# Patient Record
Sex: FEMALE | Race: BLACK OR AFRICAN AMERICAN | NOT HISPANIC OR LATINO | Employment: OTHER | URBAN - METROPOLITAN AREA
[De-identification: names, ages, dates, MRNs, and addresses within clinical notes are randomized per-mention and may not be internally consistent; named-entity substitution may affect disease eponyms.]

---

## 2017-05-22 ENCOUNTER — PATIENT OUTREACH (OUTPATIENT)
Dept: ADMINISTRATIVE | Facility: HOSPITAL | Age: 67
End: 2017-05-22

## 2017-06-28 ENCOUNTER — OFFICE VISIT (OUTPATIENT)
Dept: HEPATOLOGY | Facility: CLINIC | Age: 67
End: 2017-06-28
Payer: MEDICARE

## 2017-06-28 ENCOUNTER — HOSPITAL ENCOUNTER (OUTPATIENT)
Dept: RADIOLOGY | Facility: HOSPITAL | Age: 67
Discharge: HOME | End: 2017-06-28
Attending: INTERNAL MEDICINE
Payer: MEDICARE

## 2017-06-28 VITALS
HEIGHT: 60 IN | DIASTOLIC BLOOD PRESSURE: 60 MMHG | OXYGEN SATURATION: 96 % | HEART RATE: 59 BPM | SYSTOLIC BLOOD PRESSURE: 131 MMHG | TEMPERATURE: 98 F | BODY MASS INDEX: 41.94 KG/M2 | RESPIRATION RATE: 16 BRPM | WEIGHT: 213.63 LBS

## 2017-06-28 DIAGNOSIS — Z86.19 HISTORY OF HEPATITIS C: ICD-10-CM

## 2017-06-28 DIAGNOSIS — B19.20 COMPENSATED HCV CIRRHOSIS: Primary | ICD-10-CM

## 2017-06-28 DIAGNOSIS — K74.69 OTHER CIRRHOSIS OF LIVER: ICD-10-CM

## 2017-06-28 DIAGNOSIS — K74.69 COMPENSATED HCV CIRRHOSIS: Primary | ICD-10-CM

## 2017-06-28 DIAGNOSIS — K74.60 HEPATIC CIRRHOSIS, UNSPECIFIED HEPATIC CIRRHOSIS TYPE: ICD-10-CM

## 2017-06-28 PROCEDURE — 76700 US EXAM ABDOM COMPLETE: CPT | Mod: 26,,, | Performed by: RADIOLOGY

## 2017-06-28 PROCEDURE — 99214 OFFICE O/P EST MOD 30 MIN: CPT | Mod: S$GLB,,, | Performed by: INTERNAL MEDICINE

## 2017-06-28 PROCEDURE — 99499 UNLISTED E&M SERVICE: CPT | Mod: S$GLB,,, | Performed by: INTERNAL MEDICINE

## 2017-06-28 PROCEDURE — 99999 PR PBB SHADOW E&M-EST. PATIENT-LVL III: CPT | Mod: PBBFAC,,, | Performed by: INTERNAL MEDICINE

## 2017-06-28 PROCEDURE — 76700 US EXAM ABDOM COMPLETE: CPT | Mod: TC

## 2017-06-28 RX ORDER — GLUCOSAM/CHON-MSM1/C/MANG/BOSW 500-416.6
TABLET ORAL
COMMUNITY
Start: 2017-06-18

## 2017-06-28 RX ORDER — INSULIN ASPART 100 [IU]/ML
INJECTION, SOLUTION INTRAVENOUS; SUBCUTANEOUS
COMMUNITY
Start: 2017-06-18

## 2017-06-28 NOTE — PROGRESS NOTES
Subjective:       Patient ID: Mica Gordon is a 66 y.o. female.    Chief Complaint: Hepatitis C and Cirrhosis    HPI  I saw this 66 y.o. lady with a history of chronic HCV infection  Who has now achieved SVR 24- completed Harvoni 8 week regimen for genotype 1b      Pathology revealed advanced stage fibrosis: fibrosis is variable w/ bridging to macronodule formation, also consider steatohepatitis. Early cirrhosis (stage 4 out of 4). 5% macro and microsteatosis.      She states she feels well, has had no changes in her medical history since last seen. She denies symptoms of advanced fibrosis such as dark urine, abdominal fluid accumulation, yellowing/jaundice of skin or eyes, vomiting of blood, black stool, confusion or tremors.     MELD-Na score: 6 at 8/25/2016 10:34 AM  MELD score: 6 at 8/25/2016 10:34 AM  Calculated from:  Serum Creatinine: 1.0 mg/dL at 8/25/2016 10:34 AM  Serum Sodium: 141 mmol/L (Rounded to 137) at 8/25/2016 10:34 AM  Total Bilirubin: 0.7 mg/dL (Rounded to 1) at 8/25/2016 10:34 AM  INR(ratio): 1.0 at 8/25/2016 10:34 AM  Age: 65 years    AFP 2.7    Abdo US: 6/28/2017  1.  Hepatomegaly and findings compatible with hepatic steatosis.  2. Status post cholecystectomy.    EGD: Aug 2015  No varices    Review of Systems   Constitutional: Negative for activity change, appetite change, chills, fatigue, fever and unexpected weight change.   HENT: Negative for ear pain, hearing loss, nosebleeds, sore throat and trouble swallowing.    Eyes: Negative for redness and visual disturbance.   Respiratory: Negative for cough, chest tightness, shortness of breath and wheezing.    Cardiovascular: Negative for chest pain and palpitations.   Gastrointestinal: Negative for abdominal distention, abdominal pain, blood in stool, constipation, diarrhea, nausea and vomiting.   Genitourinary: Negative for difficulty urinating, dysuria, frequency, hematuria and urgency.   Musculoskeletal: Negative for arthralgias, back  pain, gait problem, joint swelling and myalgias.   Skin: Negative for rash.   Neurological: Negative for tremors, seizures, speech difficulty, weakness and headaches.   Hematological: Negative for adenopathy.   Psychiatric/Behavioral: Negative for confusion, decreased concentration and sleep disturbance. The patient is not nervous/anxious.          Lab Results   Component Value Date    ALT 18 06/28/2017    AST 18 06/28/2017    GGT 24 07/13/2015    ALKPHOS 72 06/28/2017    BILITOT 0.6 06/28/2017     Past Medical History:   Diagnosis Date    Diabetes mellitus type II     Hepatitis C     Hyperlipidemia     Hypertension     Seizure 2009    hx partial seizure      Past Surgical History:   Procedure Laterality Date    HYSTERECTOMY       Current Outpatient Prescriptions   Medication Sig    amlodipine (NORVASC) 10 MG tablet Take 1 tablet by mouth once daily.    aspirin (ECOTRIN) 81 MG EC tablet Take 81 mg by mouth once daily.    atorvastatin (LIPITOR) 40 MG tablet Take 40 mg by mouth once daily.    BD INSULIN SYRINGE ULT-FINE II 0.5 mL 31 gauge x 5/16 Syrg     gabapentin (NEURONTIN) 600 MG tablet Take 600 mg by mouth 2 (two) times daily.     glipiZIDE (GLUCOTROL) 10 MG tablet Take 10 mg by mouth 2 (two) times daily.    LANTUS SOLOSTAR 100 unit/mL (3 mL) InPn pen Inject 50 units subcutaneously at bedtime    lisinopril-hydrochlorothiazide (PRINZIDE,ZESTORETIC) 10-12.5 mg per tablet Take 1 tablet by mouth once daily.    metformin (GLUCOPHAGE) 1000 MG tablet Take one tablet by mouth twice daily    metoprolol tartrate (LOPRESSOR) 50 MG tablet Take 1 tablet by mouth 2 (two) times daily.    NOVOLOG 100 unit/mL injection     pravastatin (PRAVACHOL) 40 MG tablet Take one tablet by mouth every day    TRUEPLUS LANCETS 30 gauge Misc     TRUETEST TEST STRIPS Strp     omeprazole (PRILOSEC) 20 MG capsule Take 1 capsule (20 mg total) by mouth 2 (two) times daily.     No current facility-administered medications for  this visit.        Objective:      Physical Exam   Constitutional: She appears well-nourished. No distress.   HENT:   Head: Normocephalic.   Eyes: Pupils are equal, round, and reactive to light.   Neck: No JVD present. No tracheal deviation present. No thyromegaly present.   Cardiovascular: Normal rate, regular rhythm and normal heart sounds.    No murmur heard.  Pulmonary/Chest: Effort normal and breath sounds normal. No stridor.   Abdominal: Soft.   Lymphadenopathy:        Head (right side): No submental, no submandibular, no tonsillar, no preauricular, no posterior auricular and no occipital adenopathy present.        Head (left side): No submental, no submandibular, no tonsillar, no preauricular, no posterior auricular and no occipital adenopathy present.     She has no cervical adenopathy.     She has no axillary adenopathy.   Neurological: She is alert. She has normal strength. She is not disoriented. No cranial nerve deficit or sensory deficit.   Skin: Skin is intact. No cyanosis.         Assessment:       1. Compensated HCV cirrhosis    2. Other cirrhosis of liver        Plan:   Well. Stable early cirrhosis.    - HCC screening up to date  - well compensated disease    Clinic in 6 months with US/labs.  Can have one drink on 4th July but asked not to have alcohol otherwise.

## 2017-06-30 ENCOUNTER — TELEPHONE (OUTPATIENT)
Dept: HEPATOLOGY | Facility: CLINIC | Age: 67
End: 2017-06-30

## 2017-06-30 NOTE — TELEPHONE ENCOUNTER
----- Message from James Palm MD sent at 6/30/2017  3:37 PM CDT -----  Please let her know that her blood work is stable

## 2017-07-03 NOTE — PROGRESS NOTES
Patient, Mica Gordon (MRN #[id number]), presented with a recorded BMI of 41.72 kg/m^2 consistent with the definition of morbid obesity (ICD-10 E66.01). The patient's morbid obesity was monitored, evaluated, addressed and/or treated. This addendum to the medical record is made on 07/02/2017.

## 2018-03-20 DIAGNOSIS — K74.60 CIRRHOSIS OF LIVER WITHOUT ASCITES, UNSPECIFIED HEPATIC CIRRHOSIS TYPE: Primary | ICD-10-CM

## 2018-03-20 DIAGNOSIS — Z86.19 HISTORY OF HEPATITIS C: ICD-10-CM

## 2018-04-04 ENCOUNTER — HOSPITAL ENCOUNTER (OUTPATIENT)
Dept: RADIOLOGY | Facility: HOSPITAL | Age: 68
Discharge: HOME | End: 2018-04-04
Attending: INTERNAL MEDICINE
Payer: MEDICARE

## 2018-04-04 ENCOUNTER — OFFICE VISIT (OUTPATIENT)
Dept: HEPATOLOGY | Facility: CLINIC | Age: 68
End: 2018-04-04
Payer: MEDICARE

## 2018-04-04 VITALS
BODY MASS INDEX: 43.02 KG/M2 | SYSTOLIC BLOOD PRESSURE: 125 MMHG | WEIGHT: 219.13 LBS | OXYGEN SATURATION: 94 % | TEMPERATURE: 97 F | HEART RATE: 64 BPM | HEIGHT: 60 IN | DIASTOLIC BLOOD PRESSURE: 58 MMHG | RESPIRATION RATE: 18 BRPM

## 2018-04-04 DIAGNOSIS — E66.01 CLASS 3 SEVERE OBESITY DUE TO EXCESS CALORIES WITHOUT SERIOUS COMORBIDITY WITH BODY MASS INDEX (BMI) OF 40.0 TO 44.9 IN ADULT: ICD-10-CM

## 2018-04-04 DIAGNOSIS — K74.69 COMPENSATED HCV CIRRHOSIS: Primary | ICD-10-CM

## 2018-04-04 DIAGNOSIS — K74.69 COMPENSATED HCV CIRRHOSIS: ICD-10-CM

## 2018-04-04 DIAGNOSIS — B19.20 COMPENSATED HCV CIRRHOSIS: ICD-10-CM

## 2018-04-04 DIAGNOSIS — B19.20 COMPENSATED HCV CIRRHOSIS: Primary | ICD-10-CM

## 2018-04-04 PROCEDURE — 99214 OFFICE O/P EST MOD 30 MIN: CPT | Mod: S$GLB,,, | Performed by: INTERNAL MEDICINE

## 2018-04-04 PROCEDURE — 76700 US EXAM ABDOM COMPLETE: CPT | Mod: TC

## 2018-04-04 PROCEDURE — 3078F DIAST BP <80 MM HG: CPT | Mod: CPTII,S$GLB,, | Performed by: INTERNAL MEDICINE

## 2018-04-04 PROCEDURE — 99999 PR PBB SHADOW E&M-EST. PATIENT-LVL III: CPT | Mod: PBBFAC,,, | Performed by: INTERNAL MEDICINE

## 2018-04-04 PROCEDURE — 3074F SYST BP LT 130 MM HG: CPT | Mod: CPTII,S$GLB,, | Performed by: INTERNAL MEDICINE

## 2018-04-04 PROCEDURE — 76700 US EXAM ABDOM COMPLETE: CPT | Mod: 26,,, | Performed by: RADIOLOGY

## 2018-04-04 RX ORDER — INSULIN GLARGINE 100 [IU]/ML
INJECTION, SOLUTION SUBCUTANEOUS
COMMUNITY
Start: 2018-02-01 | End: 2018-04-04 | Stop reason: SDUPTHER

## 2018-04-04 RX ORDER — FLUTICASONE PROPIONATE 50 MCG
SPRAY, SUSPENSION (ML) NASAL
COMMUNITY
Start: 2018-03-27

## 2018-04-04 RX ORDER — GABAPENTIN 800 MG/1
TABLET ORAL
COMMUNITY
Start: 2018-01-30

## 2018-04-04 NOTE — PROGRESS NOTES
Subjective:       Patient ID: Mica Gordon is a 67 y.o. female.    Chief Complaint: Compensated HCV cirrhosis and Other cirrhosis of liver    HPI  I saw this 67 y.o. lady with a history of chronic HCV infection  - achieved SVR 24- completed Harvoni 8 week regimen for genotype 1b      Pathology revealed advanced stage fibrosis: fibrosis is variable w/ bridging to macronodule formation, also consider steatohepatitis. Early cirrhosis (stage 4 out of 4). 5% macro and microsteatosis.      She states she feels well, has had no changes in her medical history since last seen. She denies symptoms of advanced fibrosis such as dark urine, abdominal fluid accumulation, yellowing/jaundice of skin or eyes, vomiting of blood, black stool, confusion or tremors.     Body mass index is 42.8 kg/m².      MELD-Na score: 6 at 4/4/2018  9:05 AM  MELD score: 6 at 4/4/2018  9:05 AM  Calculated from:  Serum Creatinine: 0.9 mg/dL (Rounded to 1) at 4/4/2018  9:05 AM  Serum Sodium: 139 mmol/L (Rounded to 137) at 4/4/2018  9:05 AM  Total Bilirubin: 0.7 mg/dL (Rounded to 1) at 4/4/2018  9:05 AM  INR(ratio): 0.9 (Rounded to 1) at 4/4/2018  9:05 AM  Age: 67 years      Abdo US: 4/4/2018  Hepatomegaly and steatosis.  No focal hepatic lesions identified.  Cholecystectomy.    EGD: Aug 2015  No varices    Review of Systems   Constitutional: Negative for activity change, appetite change, chills, fatigue, fever and unexpected weight change.   HENT: Negative for ear pain, hearing loss, nosebleeds, sore throat and trouble swallowing.    Eyes: Negative for redness and visual disturbance.   Respiratory: Negative for cough, chest tightness, shortness of breath and wheezing.    Cardiovascular: Negative for chest pain and palpitations.   Gastrointestinal: Negative for abdominal distention, abdominal pain, blood in stool, constipation, diarrhea, nausea and vomiting.   Genitourinary: Negative for difficulty urinating, dysuria, frequency, hematuria and  urgency.   Musculoskeletal: Negative for arthralgias, back pain, gait problem, joint swelling and myalgias.   Skin: Negative for rash.   Neurological: Negative for tremors, seizures, speech difficulty, weakness and headaches.   Hematological: Negative for adenopathy.   Psychiatric/Behavioral: Negative for confusion, decreased concentration and sleep disturbance. The patient is not nervous/anxious.          Lab Results   Component Value Date    ALT 13 04/04/2018    AST 15 04/04/2018    GGT 24 07/13/2015    ALKPHOS 74 04/04/2018    BILITOT 0.7 04/04/2018     Past Medical History:   Diagnosis Date    Diabetes mellitus type II     Hepatitis C     Hyperlipidemia     Hypertension     Seizure 2009    hx partial seizure      Past Surgical History:   Procedure Laterality Date    HYSTERECTOMY       Current Outpatient Prescriptions   Medication Sig    amlodipine (NORVASC) 10 MG tablet Take 1 tablet by mouth once daily.    atorvastatin (LIPITOR) 40 MG tablet Take 40 mg by mouth once daily.    BD INSULIN SYRINGE ULT-FINE II 0.5 mL 31 gauge x 5/16 Syrg     fluticasone (FLONASE) 50 mcg/actuation nasal spray     gabapentin (NEURONTIN) 800 MG tablet     glipiZIDE (GLUCOTROL) 10 MG tablet Take 10 mg by mouth 2 (two) times daily.    LANTUS SOLOSTAR 100 unit/mL (3 mL) InPn pen Inject 50 units subcutaneously at bedtime    lisinopril-hydrochlorothiazide (PRINZIDE,ZESTORETIC) 10-12.5 mg per tablet Take 1 tablet by mouth once daily.    metformin (GLUCOPHAGE) 1000 MG tablet Take one tablet by mouth twice daily    metoprolol tartrate (LOPRESSOR) 50 MG tablet Take 1 tablet by mouth 2 (two) times daily.    NOVOLOG 100 unit/mL injection     TRUEPLUS LANCETS 30 gauge Misc     TRUETEST TEST STRIPS Strp     aspirin (ECOTRIN) 81 MG EC tablet Take 81 mg by mouth once daily.     No current facility-administered medications for this visit.        Objective:      Physical Exam   Constitutional: She appears well-nourished. No  distress.   HENT:   Head: Normocephalic.   Eyes: Pupils are equal, round, and reactive to light.   Neck: No JVD present. No tracheal deviation present. No thyromegaly present.   Cardiovascular: Normal rate, regular rhythm and normal heart sounds.    No murmur heard.  Pulmonary/Chest: Effort normal and breath sounds normal. No stridor.   Abdominal: Soft.   Lymphadenopathy:        Head (right side): No submental, no submandibular, no tonsillar, no preauricular, no posterior auricular and no occipital adenopathy present.        Head (left side): No submental, no submandibular, no tonsillar, no preauricular, no posterior auricular and no occipital adenopathy present.     She has no cervical adenopathy.     She has no axillary adenopathy.   Neurological: She is alert. She has normal strength. She is not disoriented. No cranial nerve deficit or sensory deficit.   Skin: Skin is intact. No cyanosis.         Assessment:       1. Compensated HCV cirrhosis    2. Class 3 severe obesity due to excess calories without serious comorbidity with body mass index (BMI) of 40.0 to 44.9 in adult        Plan:   Well. Stable early cirrhosis.    - HCC screening up to date  - well compensated disease  - weight loss recommended  Clinic in 6 months with US/labs.

## 2018-04-05 ENCOUNTER — TELEPHONE (OUTPATIENT)
Dept: HEPATOLOGY | Facility: CLINIC | Age: 68
End: 2018-04-05

## 2018-04-05 NOTE — TELEPHONE ENCOUNTER
----- Message from James Palm MD sent at 4/5/2018 11:03 AM CDT -----  Please let her know that her blood work is stable

## 2018-04-05 NOTE — TELEPHONE ENCOUNTER
----- Message from James Palm MD sent at 4/4/2018  4:14 PM CDT -----  Please let her know that her US is stable

## 2019-01-03 ENCOUNTER — TELEPHONE (OUTPATIENT)
Dept: HEPATOLOGY | Facility: CLINIC | Age: 69
End: 2019-01-03

## 2019-01-03 NOTE — TELEPHONE ENCOUNTER
MA called patient back schedule her follow up visit. She is due for labs and US also we'll schedule it the same day as the visit. Per patient just mail her the appt slip.

## 2019-01-03 NOTE — TELEPHONE ENCOUNTER
----- Message from Layne Jurado sent at 1/3/2019 10:19 AM CST -----  Contact: Pt  Patient Requesting Appointment.     Reason for  Appt.: Follow up hasnt been seen since 04/2018  Communication Preference: # [phone]  Additional Information:

## 2019-01-04 DIAGNOSIS — Z86.19 HISTORY OF HEPATITIS C: ICD-10-CM

## 2019-01-04 DIAGNOSIS — K74.69 COMPENSATED HCV CIRRHOSIS: Primary | ICD-10-CM

## 2019-01-04 DIAGNOSIS — B19.20 COMPENSATED HCV CIRRHOSIS: Primary | ICD-10-CM
